# Patient Record
Sex: MALE | Race: WHITE | URBAN - METROPOLITAN AREA
[De-identification: names, ages, dates, MRNs, and addresses within clinical notes are randomized per-mention and may not be internally consistent; named-entity substitution may affect disease eponyms.]

---

## 2023-06-29 ENCOUNTER — POST-OPERATIVE VISIT (OUTPATIENT)
Facility: LOCATION | Age: 83
End: 2023-06-29
Payer: MEDICARE

## 2023-06-29 DIAGNOSIS — H40.013 OPN ANG W/BORDERLINE FIND L RSK BIL: ICD-10-CM

## 2023-06-29 DIAGNOSIS — H25.12 AGE-RELATED NUCLEAR CATARACT, LEFT EYE: ICD-10-CM

## 2023-06-29 DIAGNOSIS — Z48.810 ENCOUNTER FOR SURGICAL AFTERCARE FOLLOWING SURGERY ON A SENSE ORGAN: ICD-10-CM

## 2023-06-29 DIAGNOSIS — H04.123 DRY EYE SYNDROME BIL LACRIML GLANDS: Primary | ICD-10-CM

## 2023-06-29 PROCEDURE — 99024 POSTOP FOLLOW-UP VISIT: CPT | Performed by: OPHTHALMOLOGY

## 2023-06-29 ASSESSMENT — INTRAOCULAR PRESSURE
OS: 10
OD: 11

## 2023-06-29 NOTE — IMPRESSION/PLAN
Impression: S/P CE/Standard IOL OD - . Encounter for surgical aftercare following surgery on a sense organ  Z48.810.  Plan: [facility] gtts as directed

## 2023-06-29 NOTE — IMPRESSION/PLAN
Impression: Opn Ang W/Borderline Find L Rsk Jhoan.  '03/21/23 Dx Last Visit' Plan: Optos: 03/21/23  DFE: 03/21/23 Glaucoma suspect OU -
CCT:
Gonio:
VF:
RNFL:

CCM w/o gtts The pathophysiology of glaucoma and the difference between having glaucoma and being a glaucoma suspect were discussed with the patient. A baseline Tom 24-2 visual field and retinal photos were taken today and shown to the patient. Nerve fiber layer analysis by OCT, and pachmetry were ordered. The patient questions were answered and stated they understood their findings.

## 2023-06-29 NOTE — IMPRESSION/PLAN
Impression: Dry Eye Syndrome Of Bilateral Lacrimal Glands. Dry eyes - tears Plan: Dry eyes OU - Recommend use of frequent high quality artificial tears, fish and/or flaxseed oil 2000 mg BID, and ointment at bedtime.

## 2023-06-29 NOTE — IMPRESSION/PLAN
Impression: Age-related nuclear cataract, left eye. Cataract - CEIOL Plan: Cataract OS: 2+ cc 2+ ns 1+sc zcboo manual monofocal. borderline pupil, on flomax did not need [place] 1st eye Cataract is visually significant and interfering with patient's visual funtion. Patient desires to see better and have cataract surgery. Retina is sufficiently stable to allow safe cataract surgery. If red reflex is not visibile during cataract surgery, trypan blue may be necessary to aid in cataract extraction safety. If dilation is poor at the time of cataract surgery, a Malyugin ring or iris hooks may be necessary to safely aid in cataract removal.  Recommend lens caclulations and cataract extraction. Risks, benefits, and alternatives discussed with patient. Patient agrees to proceed with surgery.

## 2023-07-21 ENCOUNTER — OFFICE VISIT (OUTPATIENT)
Facility: LOCATION | Age: 83
End: 2023-07-21
Payer: MEDICARE

## 2023-07-21 DIAGNOSIS — Z96.1 PRESENCE OF INTRAOCULAR LENS: ICD-10-CM

## 2023-07-21 DIAGNOSIS — H04.123 DRY EYE SYNDROME OF BILATERAL LACRIMAL GLANDS: Primary | ICD-10-CM

## 2023-07-21 PROCEDURE — 99024 POSTOP FOLLOW-UP VISIT: CPT | Performed by: OPHTHALMOLOGY

## 2023-07-21 ASSESSMENT — INTRAOCULAR PRESSURE: OS: 19

## 2023-07-25 ENCOUNTER — OFFICE VISIT (OUTPATIENT)
Facility: LOCATION | Age: 83
End: 2023-07-25
Payer: MEDICARE

## 2023-07-25 DIAGNOSIS — H40.013 OPN ANG W/BORDERLINE FIND L RSK BIL: Primary | ICD-10-CM

## 2023-07-25 DIAGNOSIS — Z96.1 PRESENCE OF INTRAOCULAR LENS: ICD-10-CM

## 2023-07-25 PROCEDURE — 99024 POSTOP FOLLOW-UP VISIT: CPT | Performed by: OPHTHALMOLOGY

## 2023-07-25 ASSESSMENT — INTRAOCULAR PRESSURE
OS: 12
OD: 12

## 2023-08-25 ENCOUNTER — OFFICE VISIT (OUTPATIENT)
Facility: LOCATION | Age: 83
End: 2023-08-25
Payer: MEDICARE

## 2023-08-25 DIAGNOSIS — Z96.1 PRESENCE OF INTRAOCULAR LENS: Primary | ICD-10-CM

## 2023-08-25 DIAGNOSIS — H40.013 OPN ANG W/BORDERLINE FIND L RSK BIL: ICD-10-CM

## 2023-08-25 DIAGNOSIS — H04.123 DRY EYE SYNDROME OF BILATERAL LACRIMAL GLANDS: ICD-10-CM

## 2023-08-25 PROCEDURE — 99024 POSTOP FOLLOW-UP VISIT: CPT | Performed by: OPHTHALMOLOGY

## 2023-08-25 ASSESSMENT — INTRAOCULAR PRESSURE
OD: 15
OS: 14

## 2024-01-03 ENCOUNTER — OFFICE VISIT (OUTPATIENT)
Facility: LOCATION | Age: 84
End: 2024-01-03
Payer: MEDICARE

## 2024-01-03 DIAGNOSIS — Z96.1 PRESENCE OF INTRAOCULAR LENS: ICD-10-CM

## 2024-01-03 DIAGNOSIS — H40.013 OPN ANG W/BORDERLINE FIND L RSK BIL: Primary | ICD-10-CM

## 2024-01-03 DIAGNOSIS — G51.0 BELLS PALSY: ICD-10-CM

## 2024-01-03 PROCEDURE — 92133 CPTRZD OPH DX IMG PST SGM ON: CPT | Performed by: OPHTHALMOLOGY

## 2024-01-03 PROCEDURE — 99214 OFFICE O/P EST MOD 30 MIN: CPT | Performed by: OPHTHALMOLOGY

## 2024-01-03 PROCEDURE — 76514 ECHO EXAM OF EYE THICKNESS: CPT | Performed by: OPHTHALMOLOGY

## 2024-01-03 ASSESSMENT — INTRAOCULAR PRESSURE
OD: 16
OS: 14
OD: 14
OS: 13

## 2024-09-09 ENCOUNTER — OFFICE VISIT (OUTPATIENT)
Facility: LOCATION | Age: 84
End: 2024-09-09
Payer: MEDICARE

## 2024-09-09 DIAGNOSIS — G51.0 BELLS PALSY: Primary | ICD-10-CM

## 2024-09-09 DIAGNOSIS — H04.123 DRY EYE SYNDROME OF BILATERAL LACRIMAL GLANDS: ICD-10-CM

## 2024-09-09 DIAGNOSIS — H40.013 OPN ANG W/BORDERLINE FIND L RSK BIL: ICD-10-CM

## 2024-09-09 PROCEDURE — 92020 GONIOSCOPY: CPT | Performed by: OPHTHALMOLOGY

## 2024-09-09 PROCEDURE — 99213 OFFICE O/P EST LOW 20 MIN: CPT | Performed by: OPHTHALMOLOGY

## 2024-09-09 ASSESSMENT — INTRAOCULAR PRESSURE
OS: 11
OD: 8
OD: 11
OS: 8